# Patient Record
Sex: MALE | Race: BLACK OR AFRICAN AMERICAN | NOT HISPANIC OR LATINO | URBAN - METROPOLITAN AREA
[De-identification: names, ages, dates, MRNs, and addresses within clinical notes are randomized per-mention and may not be internally consistent; named-entity substitution may affect disease eponyms.]

---

## 2023-06-29 ENCOUNTER — EMERGENCY (EMERGENCY)
Facility: HOSPITAL | Age: 38
LOS: 0 days | Discharge: HOME | End: 2023-06-29
Payer: OTHER MISCELLANEOUS

## 2023-06-29 VITALS
TEMPERATURE: 99 F | SYSTOLIC BLOOD PRESSURE: 117 MMHG | HEIGHT: 68 IN | HEART RATE: 89 BPM | OXYGEN SATURATION: 98 % | WEIGHT: 145.06 LBS | DIASTOLIC BLOOD PRESSURE: 79 MMHG | RESPIRATION RATE: 18 BRPM

## 2023-06-29 VITALS
SYSTOLIC BLOOD PRESSURE: 130 MMHG | OXYGEN SATURATION: 99 % | HEART RATE: 69 BPM | TEMPERATURE: 98 F | DIASTOLIC BLOOD PRESSURE: 84 MMHG | RESPIRATION RATE: 19 BRPM
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
## 2023-06-29 DIAGNOSIS — Z87.19 PERSONAL HISTORY OF OTHER DISEASES OF THE DIGESTIVE SYSTEM: Chronic | ICD-10-CM

## 2023-06-29 DIAGNOSIS — M54.50 LOW BACK PAIN, UNSPECIFIED: ICD-10-CM
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
## 2023-06-29 PROCEDURE — 72110 X-RAY EXAM L-2 SPINE 4/>VWS: CPT | Mod: 26

## 2023-06-29 PROCEDURE — 99284 EMERGENCY DEPT VISIT MOD MDM: CPT

## 2023-06-29 RX ORDER — METHOCARBAMOL 500 MG/1
1500 TABLET, FILM COATED ORAL ONCE
Refills: 0 | Status: COMPLETED | OUTPATIENT
Start: 2023-06-29 | End: 2023-06-29

## 2023-06-29 RX ORDER — KETOROLAC TROMETHAMINE 30 MG/ML
30 SYRINGE (ML) INJECTION ONCE
Refills: 0 | Status: DISCONTINUED | OUTPATIENT
Start: 2023-06-29 | End: 2023-06-29

## 2023-06-29 RX ORDER — METHOCARBAMOL 500 MG/1
2 TABLET, FILM COATED ORAL
Qty: 42 | Refills: 0
Start: 2023-06-29 | End: 2023-07-05

## 2023-06-29 RX ADMIN — METHOCARBAMOL 1500 MILLIGRAM(S): 500 TABLET, FILM COATED ORAL at 11:36

## 2023-06-29 RX ADMIN — Medication 30 MILLIGRAM(S): at 11:36

## 2023-06-29 NOTE — ED PROVIDER NOTE - PROGRESS NOTE DETAILS
Patient reports improvement in pain symptoms.  X-ray reviewed and shows no acute subluxation or fracture, however evidence of muscle spasm.  Will discharge patient with methocarbamol and naproxen.  Ortho follow-up recommended and patient stable on discharge.

## 2023-06-29 NOTE — ED PROVIDER NOTE - CLINICAL SUMMARY MEDICAL DECISION MAKING FREE TEXT BOX
37-year-old male, denies significant past medical history, presenting to the emergency room complaining of lower back pain for 2 weeks. Patient is not found to have any focal deficits on exam and is otherwise well-appearing.  He has reproducible tenderness along the lumbar spine as well as the paraspinal muscles in that area as well.  Will plan to obtain x-ray imaging of the area as patient has not had any prior imaging.  Will give methocarbamol and Toradol for pain relief.  Plan to have patient follow-up with Ortho.  Dispo pending medical work-up.

## 2023-06-29 NOTE — ED ADULT NURSE NOTE - OBJECTIVE STATEMENT
Received pt alert and oriented x3, breathing even and unlabored no distress noted. Pt to ER with complaint of left lower back pain since 6/13. Pt being treated with IBU 600mg and cyclobenzaprine with minimal relief. Pt denies any injury, reports pain comes and goes and is worse with bending.

## 2023-06-29 NOTE — ED PROVIDER NOTE - CARE PROVIDER_API CALL
Jese Mckeon  Orthopaedic Surgery  [address] [place] [address]  Phone: [phone]  Fax: [phone]  Follow Up Time: 1-3 Days

## 2023-06-29 NOTE — ED ADULT NURSE NOTE - NSFALLUNIVINTERV_ED_ALL_ED
Bed/Stretcher in lowest position, wheels locked, appropriate side rails in place/Call bell, personal items and telephone in reach/Instruct patient to call for assistance before getting out of bed/chair/stretcher/Non-slip footwear applied when patient is off stretcher/[place] to call system/Physically safe environment - no spills, clutter or unnecessary equipment/Purposeful proactive rounding/Room/bathroom lighting operational, light cord in reach

## 2023-06-29 NOTE — ED PROVIDER NOTE - OBJECTIVE STATEMENT
37-year-old male, denies significant past medical history, presenting to the emergency room complaining of lower back pain for 2 weeks.  Patient states he lifted a bag of flour at his place of work, initially injuring himself.  The following day he went to [facility] emergency room and was treated with Flexeril and ibuprofen.  He reports initially the symptoms had improved but after running out of the medication he has been having more persistent pain.  Pain is noticed when he goes from a sitting to standing position.  He has been applying topical pain patches so far for pain relief and denies any recent oral medications.  Patient has not been seen by orthopedics.  He denies any paresthesias, saddle anesthesia, lower extremity weakness, new falls or injury, headache, dizziness, chest pain or urinary symptoms.

## 2023-06-29 NOTE — ED ADULT TRIAGE NOTE - CHIEF COMPLAINT QUOTE
pt c/o lower back pain for  2 weeks. pt states he was injured at work on june 13. pt states he needs a note for his job. no history.

## 2023-06-29 NOTE — ED PROVIDER NOTE - PATIENT PORTAL LINK FT
You can access the FollowMyHealth Patient Portal offered by [facility] by registering at the following website: [email or web address]. By joining [employer]’s FollowMyHealth portal, you will also be able to view your health information using other applications (apps) compatible with our system.

## 2023-06-29 NOTE — ED PROVIDER NOTE - PHYSICAL EXAMINATION
VITAL SIGNS: I have reviewed nursing notes and confirm.  CONSTITUTIONAL: Well-developed; well-nourished; in no acute distress.  SKIN: Skin is warm and dry, no acute rash.  HEAD: Normocephalic; atraumatic.  NECK: Supple; non tender.  CARD: S1, S2 normal; no murmurs, gallops, or rubs. Regular rate and rhythm.  RESP: No wheezes, rales or rhonchi.  SPINE: +ttp along the lumbar spine, midline and kevin paraspinal ttp in that region/  EXT: Normal ROM. No clubbing, cyanosis or edema.  NEURO: Alert, oriented. Grossly unremarkable. AMBROSE, normal tone, no gross motor or sensory changes. Fluent speech.   PSYCH: Cooperative, appropriate. Mood and affect wnl.

## 2025-01-09 NOTE — ED PROVIDER NOTE - NSDCPRINTRESULTS_ED_ALL_ED
Identified pt with two pt identifiers (name and ). Reviewed chart in preparation for visit and have obtained necessary documentation.    Scooter Dickerson is a 76 y.o. male Colonoscopy  .    Vitals:    25 1529 25 1531   BP: (!) 171/74 (!) 174/70   Site: Right Upper Arm    Position: Sitting    Cuff Size: Medium Adult    Pulse: 80    Resp: 20    Temp: 98.1 °F (36.7 °C)    TempSrc: Oral    SpO2: 96%    Weight: 61.2 kg (135 lb)    Height: 1.676 m (5' 6\")           1. Have you been to the ER, urgent care clinic since your last visit?  Hospitalized since your last visit?  no     2. Have you seen or consulted any other health care providers outside of the [facility] since your last visit?  Include any pap smears or colon screening.  Yes [facility]    BP elevated. Patient advised to  follow up with PCP and check BP twice a day at home.   
Unable to Pay for Housing in the Last Year: No     Number of Times Moved in the Last Year: 1     Homeless in the Last Year: No       No family history on file.    Allergies   Allergen Reactions    Penicillins Rash       Current Outpatient Medications   Medication Sig Dispense Refill    metFORMIN (GLUCOPHAGE) 1000 MG tablet Take 1 tablet by mouth 2 times daily (with meals) 180 tablet 1    guaiFENesin (MUCINEX) 600 MG extended release tablet Take 2 tablets by mouth 2 times daily 360 tablet 0    benzonatate (TESSALON) 100 MG capsule Take 1 capsule by mouth 3 times daily as needed for Cough 90 capsule 0    tiotropium-olodaterol (STIOLTO) 2.5-2.5 MCG/ACT AERS Inhale 2 puffs into the lungs daily 12 g 0    atorvastatin (LIPITOR) 40 MG tablet Take 1 tablet by mouth daily 90 tablet 0    vitamin D3 (CHOLECALCIFEROL) 25 MCG (1000 UT) TABS tablet Take 2 tablets by mouth daily 180 tablet 0    albuterol sulfate HFA (PROVENTIL HFA) 108 (90 Base) MCG/ACT inhaler Inhale 2 puffs into the lungs every 6 hours as needed for Wheezing 18 g 5    gabapentin (NEURONTIN) 300 MG capsule Take 3 capsules by mouth in the morning and at bedtime for 90 days. Max Daily Amount: 1,800 mg (Patient taking differently: Take 3 capsules by mouth in the morning and at bedtime. 500mg 1.5 bid) 540 capsule 0    alendronate (FOSAMAX) 70 MG tablet Take 1 tablet by mouth once a week 12 tablet 0    clopidogrel (PLAVIX) 75 MG tablet Take 1 tablet by mouth daily 90 tablet 0    Misc. Devices (HOME STYLE BED RAILS) MISC For use on bed 1 each 0    finasteride (PROSCAR) 5 MG tablet Take 1 tablet by mouth daily 90 tablet 0    calcium carbonate (TUMS EX) 750 MG chewable tablet Take 2 tablets by mouth daily 360 tablet 1    ASPIRIN 81 PO Take 1 tablet every day by oral route.       No current facility-administered medications for this visit.       The above histories, medications and allergies have been reviewed.    Review of Systems      BP (!) 174/70   Pulse 80   Temp 
Patient requests all Lab, Cardiology, and Radiology Results on their Discharge Instructions